# Patient Record
Sex: MALE | Race: AMERICAN INDIAN OR ALASKA NATIVE | NOT HISPANIC OR LATINO | URBAN - METROPOLITAN AREA
[De-identification: names, ages, dates, MRNs, and addresses within clinical notes are randomized per-mention and may not be internally consistent; named-entity substitution may affect disease eponyms.]

---
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
## 2019-03-14 ENCOUNTER — OUTPATIENT (OUTPATIENT)
Dept: OUTPATIENT SERVICES | Facility: HOSPITAL | Age: 19
LOS: 1 days | Discharge: HOME | End: 2019-03-14

## 2019-03-14 DIAGNOSIS — J02.9 ACUTE PHARYNGITIS, UNSPECIFIED: ICD-10-CM

## 2019-08-30 ENCOUNTER — OUTPATIENT (OUTPATIENT)
Dept: OUTPATIENT SERVICES | Facility: HOSPITAL | Age: 19
LOS: 1 days | Discharge: HOME | End: 2019-08-30

## 2019-08-30 DIAGNOSIS — J30.1 ALLERGIC RHINITIS DUE TO POLLEN: ICD-10-CM

## 2023-08-06 ENCOUNTER — EMERGENCY (EMERGENCY)
Facility: HOSPITAL | Age: 23
LOS: 0 days | Discharge: HOME | End: 2023-08-06
Attending: STUDENT IN AN ORGANIZED HEALTH CARE EDUCATION/TRAINING PROGRAM
Payer: MEDICAID

## 2023-08-06 VITALS
SYSTOLIC BLOOD PRESSURE: 127 MMHG | RESPIRATION RATE: 18 BRPM | DIASTOLIC BLOOD PRESSURE: 80 MMHG | OXYGEN SATURATION: 100 % | WEIGHT: 199.96 LBS | HEART RATE: 88 BPM | TEMPERATURE: 99 F

## 2023-08-06 DIAGNOSIS — M25.512 PAIN IN LEFT SHOULDER: ICD-10-CM
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
## 2023-08-06 PROCEDURE — 73030 X-RAY EXAM OF SHOULDER: CPT | Mod: LT

## 2023-08-06 PROCEDURE — 73000 X-RAY EXAM OF COLLAR BONE: CPT | Mod: LT

## 2023-08-06 PROCEDURE — 73000 X-RAY EXAM OF COLLAR BONE: CPT | Mod: 26,LT

## 2023-08-06 PROCEDURE — 73030 X-RAY EXAM OF SHOULDER: CPT | Mod: 26,LT

## 2023-08-06 PROCEDURE — 99284 EMERGENCY DEPT VISIT MOD MDM: CPT

## 2023-08-06 PROCEDURE — 73020 X-RAY EXAM OF SHOULDER: CPT | Mod: LT

## 2023-08-06 PROCEDURE — 73060 X-RAY EXAM OF HUMERUS: CPT | Mod: LT

## 2023-08-06 PROCEDURE — 99284 EMERGENCY DEPT VISIT MOD MDM: CPT | Mod: 25

## 2023-08-06 PROCEDURE — 73060 X-RAY EXAM OF HUMERUS: CPT | Mod: 26,LT

## 2023-08-06 PROCEDURE — 73020 X-RAY EXAM OF SHOULDER: CPT | Mod: 26,59,LT

## 2023-08-06 RX ORDER — IBUPROFEN 200 MG
800 TABLET ORAL ONCE
Refills: 0 | Status: COMPLETED | OUTPATIENT
Start: 2023-08-06 | End: 2023-08-06

## 2023-08-06 RX ADMIN — Medication 800 MILLIGRAM(S): at 03:50

## 2023-08-06 NOTE — ED PROVIDER NOTE - NSFOLLOWUPINSTRUCTIONS_ED_ALL_ED_FT
Follow up with orthopedist.     Our Emergency Department Referral Coordinators will be reaching out to you in the next 24-48 hours from 9:00am to 5:00pm with a follow up appointment. Please expect a phone call from the hospital in that time frame. If you do not receive a call or if you have any questions or concerns, you can reach them at   [phone]      Shoulder Pain  Many things can cause shoulder pain, including:  An injury.  Moving the shoulder in the same way again and again (overuse).  Joint pain (arthritis).  Pain can come from:  Swelling and irritation (inflammation) of any part of the shoulder.  An injury to the shoulder joint.  An injury to:  Tissues that connect muscle to bone (tendons).  Tissues that connect bones to each other (ligaments).  Bones.  Follow these instructions at home:  Watch for changes in your symptoms. Let your doctor know about them. Follow these instructions to help with your pain.    If you have a sling:    Wear the sling as told by your doctor. Remove it only as told by your doctor.  Loosen the sling if your fingers:  Tingle.  Become numb.  Turn cold and blue.  Keep the sling clean.  If the sling is not waterproof:  Do not let it get wet.  Take the sling off when you shower or bathe.  Managing pain, stiffness, and swelling    Bag of ice on a towel on the skin.  If told, put ice on the painful area:  Put ice in a plastic bag.  Place a towel between your skin and the bag.  Leave the ice on for 20 minutes, 2–3 times a day. Stop putting ice on if it does not help with the pain.  Squeeze a soft ball or a foam pad as much as possible. This prevents swelling in the shoulder. It also helps to strengthen the arm.  General instructions    Take over-the-counter and prescription medicines only as told by your doctor.  Keep all follow-up visits as told by your doctor. This is important.  Contact a doctor if:  Your pain gets worse.  Medicine does not help your pain.  You have new pain in your arm, hand, or fingers.  Get help right away if:  Your arm, hand, or fingers:  Tingle.  Are numb.  Are swollen.  Are painful.  Turn white or blue.  Summary  Shoulder pain can be caused by many things. These include injury, moving the shoulder in the same away again and again, and joint pain.  Watch for changes in your symptoms. Let your doctor know about them.  This condition may be treated with a sling, ice, and pain medicine.  Contact your doctor if the pain gets worse or you have new pain. Get help right away if your arm, hand, or fingers tingle or get numb, swollen, or painful.  Keep all follow-up visits as told by your doctor. This is important.  This information is not intended to replace advice given to you by your health care provider. Make sure you discuss any questions you have with your health care provider.

## 2023-08-06 NOTE — ED PROVIDER NOTE - PATIENT PORTAL LINK FT
You can access the FollowMyHealth Patient Portal offered by [facility] by registering at the following website: [email or web address]. By joining [facility]’s FollowMyHealth portal, you will also be able to view your health information using other applications (apps) compatible with our system.

## 2023-08-06 NOTE — ED PROVIDER NOTE - CLINICAL SUMMARY MEDICAL DECISION MAKING FREE TEXT BOX
23-year-old male with a past medical history presenting today with left shoulder injury after he had mechanical fall onto his left shoulder.  Denies any head injury or LOC.  On exam neurovascular tact left upper extremity however patient endorses indicated pain with attempting to touch his right shoulder.  No deformities noted.  Tenderness to the left AC joint.  X-rays reviewed no acute displaced fractures noted, joint alignment appears well-maintained.  Placed in sling, follow-up with orthopedic surgery for definitive diagnosis and management.

## 2023-08-06 NOTE — ED PROVIDER NOTE - PHYSICAL EXAMINATION
PHYSICAL EXAM: I have reviewed current vital signs.  GENERAL: NAD, well-nourished; well-developed.  HEAD:  Normocephalic, atraumatic.  EYES: Conjunctiva and sclera clear.  ENT: MMM, no erythema/exudates.  NECK: Supple, no JVD.  CHEST/LUNG: Clear to auscultation bilaterally; no wheezes, rales, or rhonchi.  HEART: Regular rate and rhythm, normal S1 and S2; no murmurs, rubs, or gallops.  ABDOMEN: Soft, nontender, nondistended.  EXTREMITIES:  Tenderness palpation of left anterior shoulder. Limited range of motion secondary to pain; patient unable to touch contralateral shoulder. 2+ peripheral pulses; no clubbing, cyanosis, or edema.  PSYCH: Cooperative, appropriate, normal mood and affect.  NEUROLOGY: A&O x 3. No focal neurological deficits.   SKIN: Warm and dry.

## 2023-08-06 NOTE — ED PROVIDER NOTE - OBJECTIVE STATEMENT
23-year-old male with no significant past medical history presents to the ED complaining of left shoulder pain that began just prior to arrival.  Patient states he slipped on wet floor at home and fell directly on his left shoulder.  No head trauma, LOC, or vomiting.  Patient has not taken anything for the pain.

## 2023-08-06 NOTE — ED ADULT NURSE NOTE - NSFALLRISKINTERV_ED_ALL_ED

## 2025-05-12 NOTE — ED ADULT NURSE NOTE - ISOLATION TYPE:
[facility]  NEW PATIENT EVALUATION    Chief Complaint    Back Pain          -------- HISTORY OF PRESENT ILLNESS --------   Ashwin Jarrett is a 60 year old with pmhx of HLD being evaluated today for evaluation and treatment of back pain.    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 Pain has been persistent. Pain in the low back and down the LLE posterolateral to toes.      Location of pain:  L>R low back    Radiates: LLE posterolateral to toes  Pain character: aching and sharp, N/T  Numbness/tingling: intermittent to LE  Weakness: no  Pain Score: 7/10  Pain duration: comes and goes   The Pain is Aggravating with:  lifting things, standing, walking, bending, sitting  The Pain is relieved with:  leaning on things or changing position  Also has tried: pain medication and PT, rubs  Urinary Incontinence: none  Fecal Incontinence: none   Saddle Anesthesia: none   Tried: Home PT for the last 6 weeks, OTC, Rest       Denies current infection, anticoagulation or allergy to Latex/steroid/contrast dye. Denies weakness, numbness or bowel/bladder incontinence. Denies Saddle Anesthesia.    Current treatment: tylenol, ibuprofen, robaxin, allopurinol, lido patch, meloxicam    Previous Injections:   -none    [place] prescription monitoring checked: Yes  ORT:  Opioid Risk Score:  3  Opioid Risk Category: Low Risk      Prior Pain Medications:             Oral Steroids: no   Muscle Relaxants: yes  Gabapentin: no  Lyrica: no    Duloxetine: no    Chronic opiates: no   Amitriptyline: no   Nortriptyline: no         Other Treatments:             Physical therapy: yes >6 weeks in last 6 months with ongoing HEP  Chiropractor: no    TENS: no    Brace: no    Acupuncture: no   Prior Pain Clinic: no        PMH:    Past Medical History:   Diagnosis Date    Hyperlipidemia        PSH:    Past Surgical History:   Procedure Laterality Date    Hernia repair       Leg/ankle surgery proc unlisted Left     1 adalid and some screws 1990's       MEDICATIONS:    Outpatient Medications Marked as Taking for the 5/13/25 encounter (Hospital Encounter) with Ramone Srivastava MD   Medication Sig Dispense Refill    Acetaminophen Extra Strength 500 MG Tab TAKE 1 TABLET BY MOUTH EVERY 4 TO 6 HOURS AS NEEDED      ibuprofen (MOTRIN) 800 MG tablet Take 800 mg by mouth 4 times daily as needed.      allopurinol (ZYLOPRIM) 100 MG tablet Take 1 tablet by mouth daily. 90 tablet 0    lidocaine (LIDODERM) 5 % patch Place 1 patch onto the skin every 24 hours. Remove patch 12 hours after applying 30 patch 0    ferrous gluconate (FERGON) 324 (38 Fe) MG tablet Ferrous Gluconate 324 (37.5 Fe) MG Oral Tablet RxNorm: 710566 Ferrous Gluconate 324 (37.5 Fe) MG Not Indicated Not Indicated 1 tablet orally daily with water or juice between meals Ms THA RAY ASUNCION, P  [facility]  1/29/2021      fenofibrate (TRICOR) 145 MG tablet Take 145 mg by mouth daily.      meloxicam (MOBIC) 7.5 MG tablet Take 1 tablet by mouth daily. As needed for lower back pain. 30 tablet 1       ALLERGIES:    ALLERGIES:  Patient has no known allergies.    SH:  Social History     Socioeconomic History    Marital status: Significant other     Spouse name: Not on file    Number of children: Not on file    Years of education: Not on file    Highest education level: Not on file   Occupational History    Not on file   Tobacco Use    Smoking status: Former     Types: Cigarettes    Smokeless tobacco: Never   Vaping Use    Vaping status: never used   Substance and Sexual Activity    Alcohol use: Yes    Drug use: Never    Sexual activity: Yes   Other Topics Concern    Not on file   Social History Narrative    Not on file     Social Drivers of Health     Financial Resource Strain: Not on file   Food Insecurity: Not on file   Transportation Needs: Not on file   Physical Activity: Not on file   Stress: Not on file   Social Connections:  Not on file   Feeling safe in your relationship: Not on file       FH:  No history of chronic pain disorders in primary relatives       Family History   Problem Relation Age of Onset    Dementia/Alzheimers Father     Hypertension Maternal Grandmother        Review of Systems:  GENERAL:No fevers  HEENT: Negative for frequent or significant headaches  NECK: Negative for swelling  RESPIRATORY: Negative for shortness of breath  CARDIOVASCULAR: Negative for chest pain  GASTROINTESTINAL: Neg for abdominal pain  GENITOURINARY: Negative for incontinence  MUSCULOSKELETAL: As above in HPI  NEUROLOGIC:Negative for syncope  SKIN: Negative for rash  ENDOCRINE: Negative for cold or heat intolerance           ----------------PHYSICAL EXAMINATION  --------------------     Visit Vitals  BP (!) 150/95 (BP Location: RUE - Right upper extremity, Patient Position: Sitting)   Pulse 89   SpO2 94%          ____________________ CONSTITUTIONAL ____________________     Gen. appearance: Appears well nourished, well developed and appropriately groomed   There is no height or weight on file to calculate BMI.               ____________________ CARDIOVASCULAR ____________________   HEART: Regular rate and rhythm   RESPIRATORY:  Non-labored respirations  PERIPHERAL VASCULAR: Extremities are warm and dry; there is no edema, swelling, varicosities, or palpable tenderness in the upper or lower extremities               ____________________ MUSCULOSKELETAL ____________________   GAIT/STATION: Gait is antalgic. The patient is able to ambulate without assistance  RANGE OF MOTION: Full range of motion of b/l lower extremities without pain, crepitus, contracture  SPINE: Normal lordosis in the cervical and lumbar spine, Palpation/percussion at the midline cervical/thoracic/lumbar level reveals NO tenderness    Range of Motion Cervical Presence of Pain Lumbar Presence of Pain   Flexion   limited yes   Extension   limited yes   Lat Flexion       Lat Rotation          TRIGGER POINTS: painful muscle spasm NOT identified    STRENGTH R L STRENGTH R L   Deltoid (C5)   Psoas (L2) 5 5   Biceps (C6)   Quad (L3) 5 5   Triceps (C7)   ATib (L4) 5 5   Wr.Ext (C6)   EHL (L5) 5 5   Finger Abduction (T1)    Gastr (S1) 5 5   Hand    Hamstring (S2) 5 5        The patient was cooperative and exhibited normal effort during motor and coordination exams    Provocative Tests Result   Spurling's    SLR sitting Pos L   Fredi/SKYE Neg B   Gaenslen's Neg B   Thigh Thrust Neg B   SIJ tenderness with palpation Pos B   GTB Tenderness Neg B   Castano's    Cogwheeling            ________________________ Skin ____________________   Skin color, temperature, & turgor are normal in all 4 extremities. No rashes/suspicious skin lesions. No edema in the upper or lower extremities.        ____________________ Neurologic ____________________   ORIENTATION: Alert & oriented to person, place,time,event   PSYCHIATRIC: MOOD/AFFECT: Normal      COORDINATION: Normal coordination and balance      SENSATION: Intact to light touch and temperature sensation in the cervical/thoracic/lumbar/sacral bilateral dermatomes      DTR R L DTR R L DTR R L   BR   Patellar 2 2 Babinski     Biceps   Achilles 2 2 Clonus     Triceps                           -------- MEDICAL DECISION MAKING   ----------           ____________________ DATA REVIEW ____________________       I have independently reviewed and interpreted the following images/reports and reviewed them with the patient.       MRI Lumbar spine 3/17/25:  FINDINGS:     The alignment is normal. Vertebral bodies are normal in height without  evidence of compression fractures. Modic type II endplate degenerative  signal is most pronounced at the opposing endplates of L5-S1, otherwise  marrow signal is normal. The conus medullaris terminates at the level of  L1-L2 and the distal spinal cord signal intensity is normal. The  intervertebral discs are normal in height. Extensive  ventral and dorsal  epidural lipomatosis contributing spinal canal stenosis particularly within  the lower lumbar spine at L4 and L5. Bilateral renal cysts, left greater  than right.     L1-L2: Mild diffuse posterior disc bulge. Mild bilateral facet arthropathy  and ligamentum flavum thickening. No significant neuroforaminal narrowing.  No significant spinal canal stenosis.     L2-L3: No significant posterior disc bulge. Mild bilateral facet  arthropathy and ligamentum flavum thickening. No significant neuroforaminal  narrowing. No significant spinal canal stenosis.     L3-L4: Mild diffuse posterior disc bulge. Moderate bilateral facet  arthropathy and ligamentum flavum thickening. Mild bilateral neuroforaminal  narrowing. Mild to moderate spinal canal stenosis.     L4-L5: Mild diffuse posterior disc bulge. Moderate to severe bilateral  facet arthropathy and ligamentum flavum thickening. Mild to moderate  bilateral neuroforaminal narrowing. Severe spinal canal stenosis with  complete effacement of the CSF space with circumferential epidural  lipomatosis..     L5-S1: Mild diffuse posterior disc bulge. Mild bilateral facet arthropathy.  Mild bilateral neuroforaminal narrowing. Severe spinal canal stenosis with  complete effacement of the CSF space with circumferential epidural  lipomatosis..     IMPRESSION:     Advanced degenerative changes of the lumbar spine most pronounced at L4-5  and L5-S1 where there is severe spinal canal stenosis with complete  effacement of the CSF space and circumferential epidural lipomatosis.  Additional significant degenerative findings are described in the body of  the report.       ____________________ Assessment ____________________     Assessment: Ashwin Jarrett is a 60 year old with pmhx of HLD being evaluated today for evaluation and treatment of back pain.  Patient presenting with about a year of back pain down the left lower extremity posterior lateral to the toes that started  None after work-related injury when he was hit by work forklift.  Since then pain has been in the low back down the left lower extremity worse with lifting things, standing, walking as well as bending.  He finds himself constantly changing positions or leaning on things.  He has done physical therapy more than 6 weeks in the last 6 months.  He continues with a daily home exercise program.  He denies any red flag symptoms.  He is been taking Tylenol as well as ibuprofen and has taken muscle relaxants in the past.  Denies any previous surgeries or injections.  He was recently evaluated by spine surgery who recommended conservative therapies.  On exam he has limited mobility in lumbar spine with pain in flexion and extension.  His strength and sensation are intact in the bilateral lower extremities.  He does have a positive straight leg raise on the left.  MRI of the lumbar spine shows some degenerative changes most at L5-S1.  He does have severe spinal canal stenosis at the L4-5 and L5-S1 level due to significant circumferential epidural lipomatosis.  He has radicular pain consistent with stenosis in the lumbar spine.  We will have him return for a left L5 and S1 transforaminal epidural steroid injection.  He is understanding and agreeable to this plan.  All questions answered today.      We also discussed both risk and benefits of our treatment plan and the patient understands[place] Pertinent imaging and notes reviewed.  Discussion included but not limited to non opioid medicinal products or drug products, interventional procedures and/or treatments, acupuncture, massage therapy, PT and OT.          Diagnosis list:      1)      lumbar radiculitis   2)      lumbar spondylosis        3)      severe spinal stenosis due to epidural lipomatosis          ____________________ Treatment Recommendations _____________     Injections recommended:  left L5 and S1 TFESI, no med hold   Studies or tests  ordered today: none        Physical Therapy:  CPM   Activity Level: as tolerated         Medications prescribed: CPM         Follow up visit: for injection    Education was provided to the patient regarding diagnosis, treatment options, medications, and SIDE EFFECTS thereof.  The education was completed using face to face conversation and the recipient expressed understanding verbally.       Note to patient: The 21st Century Cures Act makes medical notes like these available to patients in the interest of transparency. However, be advised this is a medical document. It is intended primarily as peer to peer communication. It is written in medical language and may contain abbreviations or verbiage that are unfamiliar. It may appear blunt or direct. This is because medical documents are intended to carry relevant information, facts as evident, and the clinical opinion of the practitioner only as of the time of writing. This note may have been transcribed using a voice dictation system. Voice recognition errors may occur. This not be taken to alter the content or meaning of this note.      Ramone Srivastava MD  Interventional Pain Management  [facility]